# Patient Record
Sex: FEMALE | Employment: FULL TIME
[De-identification: names, ages, dates, MRNs, and addresses within clinical notes are randomized per-mention and may not be internally consistent; named-entity substitution may affect disease eponyms.]

---

## 2021-04-12 ENCOUNTER — TRANSCRIBE ORDERS (OUTPATIENT)
Dept: OTHER | Age: 46
End: 2021-04-12

## 2021-04-12 DIAGNOSIS — L65.9 ALOPECIA: Primary | ICD-10-CM

## 2021-09-14 ENCOUNTER — OFFICE VISIT (OUTPATIENT)
Dept: DERMATOLOGY | Facility: CLINIC | Age: 46
End: 2021-09-14
Attending: DERMATOLOGY
Payer: COMMERCIAL

## 2021-09-14 DIAGNOSIS — L65.9 LOSS OF HAIR: Primary | ICD-10-CM

## 2021-09-14 DIAGNOSIS — L30.9 DERMATITIS: ICD-10-CM

## 2021-09-14 PROCEDURE — 99204 OFFICE O/P NEW MOD 45 MIN: CPT | Performed by: DERMATOLOGY

## 2021-09-14 RX ORDER — GABAPENTIN 300 MG/1
300 CAPSULE ORAL
COMMUNITY
Start: 2020-08-04

## 2021-09-14 RX ORDER — SUCRALFATE 1 G/1
1 TABLET ORAL
COMMUNITY
Start: 2020-07-28

## 2021-09-14 RX ORDER — METHOCARBAMOL 500 MG/1
TABLET, FILM COATED ORAL
COMMUNITY
Start: 2021-01-09

## 2021-09-14 RX ORDER — CYCLOBENZAPRINE HCL 10 MG
10 TABLET ORAL
COMMUNITY
Start: 2020-09-22

## 2021-09-14 RX ORDER — CHLORHEXIDINE GLUCONATE ORAL RINSE 1.2 MG/ML
SOLUTION DENTAL
COMMUNITY
Start: 2021-03-05

## 2021-09-14 RX ORDER — IBUPROFEN 600 MG/1
TABLET, FILM COATED ORAL
COMMUNITY
Start: 2021-03-05

## 2021-09-14 RX ORDER — FLUOCINONIDE TOPICAL SOLUTION USP, 0.05% 0.5 MG/ML
SOLUTION TOPICAL
COMMUNITY
Start: 2020-02-13

## 2021-09-14 RX ORDER — CLOBETASOL PROPIONATE 0.5 MG/ML
SOLUTION TOPICAL
COMMUNITY
Start: 2021-06-09

## 2021-09-14 RX ORDER — SUCRALFATE 1 G/1
TABLET ORAL
COMMUNITY
Start: 2021-02-03

## 2021-09-14 RX ORDER — FLUOCINOLONE ACETONIDE 0.11 MG/ML
OIL TOPICAL
Qty: 60 ML | Refills: 5 | Status: SHIPPED | OUTPATIENT
Start: 2021-09-14

## 2021-09-14 RX ORDER — FLUOCINONIDE TOPICAL SOLUTION USP, 0.05% 0.5 MG/ML
SOLUTION TOPICAL
COMMUNITY
Start: 2020-12-21

## 2021-09-14 RX ORDER — ESTRADIOL 10 UG/1
10 INSERT VAGINAL
COMMUNITY
Start: 2020-12-01

## 2021-09-14 RX ORDER — AMLODIPINE BESYLATE 5 MG/1
TABLET ORAL
COMMUNITY
Start: 2021-05-06

## 2021-09-14 ASSESSMENT — PAIN SCALES - GENERAL: PAINLEVEL: NO PAIN (0)

## 2021-09-14 NOTE — NURSING NOTE
Chief Complaint   Patient presents with     Hair Loss     Areli, is here for a hairloss appt, no other concerns     Terry Strong EMT

## 2021-09-14 NOTE — PROGRESS NOTES
HairMetrix Summary (September 14, 2021)    Frontal anterior      Mid scalp      Vertex      Occipital      Right temporal      Left temporal      Summary

## 2021-09-14 NOTE — PROGRESS NOTES
[facility] Dermatology Note  Encounter Date: Sep 14, 2021  Office Visit     Dermatology Problem List:  1.  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   - Hair metrix performed today   - Labs ordered: CBC, CMP, Vit D, TSH, Iron studies (ferritin, iron), androgen studies (free and total testosterone, DHEAS), liver, and renal panel  - Discussed following up with [facility] allergy clinic for allergy testing  - Start applying fluocinolone (derma-smooth) oil on the scalp before bedtime. For severe flares, may use this up to twice daily.        Procedures Performed:   Hair Metrix:              - Frontal Scalp: Decreased hair density              - Mid-Scalp: Mildly decreased hair density              - Vertex Scalp: Decreased hair density with perifollicular scale              - Occipital Scalp: Decreased hair density              - Right Temporal Scalp: Severely decreased hair density with perifollicular scale              - Left Temporal Scalp: Severely decreased hair density     Follow-up: 1 week(s) virtually (telephone with photos), or earlier for new or changing lesions; 5 month(s) in person    Follow-up: 5 month(s) in-person, or earlier for new or changing lesions    Staff and Medical Student:     Sarahi Bowen, MS4      Scribe Disclosure:  I, Silvana Maki, am serving as a scribe to document services personally performed by Mona Serrato MD based on data collection and the provider's statements to me.     Provider Disclosure:   The documentation recorded by the scribe accurately reflects the services I personally performed and the decisions made by me.    Staff Physician:  I was  "present with the medical student who participated in the service and in the documentation of the note. I have verified the history and personally performed the physical exam and medical decision making. I agree with the assessment and plan of care as documented in the note.       Mona Serrato MD  Professor and Chair  Department of Dermatology  [facility]: Phone: [phone], Fax:[phone]  [facility]: Phone: [phone], Fax: [phone]      ____________________________________________    CC: No chief complaint on file.    HPI:  Ms. Areli Navarro is a 46 year old female who presents as a new patient for evaluation of hair loss.   - Seen at the request of: Dr. Drummond  - Reason for referral: Lymphocyte mediated scarring alopecia  - Onset of hair loss: 4 years ago it started to fall out and she's getting new spots (frontal scalp lost hair about a month ago)  - Affected areas: sides, lower back, and beginning to be in the front  - Shedding or thinning, or both: Initially there was more shedding, but that has decreased.  Now her hair is more so thinning.  - Percentage of hair loss: 50%  No Scalp pain   Previously Scalp burning - used to experience it when she used a certain shampoo with formaldehyde, has improved since stopping that shampoo   Yes Scalp itching - occasionally, improved with change in shampoo   No Eyebrow changes    No Eyelash changes   NA Beard changes    No Other body hair changes    No Nail changes    No Additional symptoms? (please list below)     - Current treatments: Intralesional steroid, most recent was in June of 2021; notes small improvement; Lidex solution every day for the past two years with no improvement, she reports a bottle lasts for about 3 weeks at a time  - Prior discontinued treatments: clobetasol external solution  - Prior biopsies: September 2020, \"which " "showed lymphocyte mediated scarring alopecia with lichenoid infiltrate. It was consistent with central centrifugal alopecia or frontal fibrosing alopecia or lichen planopilaris\" (records available: 9/2/20 in care everywhere)  - Prior labs: CBC from 1/5/21 WNL (records available: in care everywhere)  - Scalp or hair care habits/products:   - - - Which products? How many times per week? She started using a shampoo that contained formaldehyde about a year ago to hopefully improve her hair loss.  She reports that this caused scalp burning and itching that improved when she discontinued the shampoo.  She switched to an \"all-natural\" shampoo about a month ago.  She reports washing her hair every other day and letting it air dry.  She uses a leave in conditioner and occasionally uses aloe vera.  - - - Frequency of hair sprays, gels, dyes, heat styling, perms, weaves or extensions? She endorses braiding her hair up until 2019.  She is now wearing her natural hair.   - - - Sunscreen use on face or scalp? If so, what brand?  No  - Menses: hysterectomy in 2016 due fibroids, ovaries were not removed   - Unwanted hair growth/hirsutism: none  - Diet (meat, vegetarian, mixed): Lots of fruits and vegetables, not much meat or dairy, drinks a lot of water  - Recent weight loss: none  - Personal history of thyroid dysfunction or autoimmune disease: none  - Family history of hair loss: Yes, aunt's hair is completely gone, cousin with absolutely no hair, eyelashes, aunt with frontal alopecia, mother had localized alopecia, sister with loss of hair on the sides; daughter is 29 and has no issues with hair loss  - Family history of autoimmune disease: none  - Major family, financial, school/work, or other social stressors in the few months prior to hair loss: None  - Major recent illness/hospitalizations: none  - COVID status: Not vaccinated, had COVID in July    Patient is otherwise feeling well, in usual state of health, and has no " additional skin concerns today.     ROS: As per HPI    Labs:  CBC , CMP  and BMP  from January 2020 reviewed.    Physical Exam:  Vitals: There were no vitals taken for this visit.  GEN: Well developed, well-nourished, in no acute distress, in a pleasant mood.    SKIN: Focused examination of the scalp was performed.  - Bautista type: V  - No diffuse erythema   - Positive for perifollicular erythema  - Positive for perifollicular scale   - No scaling of the scalp   - Negative hair pull test   - Normal eyelash density  - Normal eyebrow density  - No nail pitting or dystrophy   - No scalp folliculitis/pustules   - In comparison to prior photographs, no prior photos available  - No other lesions of concern on areas examined.     Medications:  No current outpatient medications on file.     No current facility-administered medications for this visit.      Past Medical History:   There is no problem list on file for this patient.    No past medical history on file.    CC Jean-Pierre Drummond MD  [facility] [address] [place] [place] [address] on close of this encounter.

## 2021-09-14 NOTE — PATIENT INSTRUCTIONS
Please arrange a consultation with the contact dermatitis clinic at [facility].    The site to look at is called [facility].     Let us know where you will have labs done and the clinic will fax the requests to this lab.     Apply oil 2 to 3 times per week to scalp, ideally shampoo after application.

## 2021-11-07 ENCOUNTER — HEALTH MAINTENANCE LETTER (OUTPATIENT)
Age: 46
End: 2021-11-07

## 2021-11-26 ENCOUNTER — VIRTUAL VISIT (OUTPATIENT)
Dept: DERMATOLOGY | Facility: CLINIC | Age: 46
End: 2021-11-26
Payer: COMMERCIAL

## 2021-11-26 DIAGNOSIS — L30.9 DERMATITIS: ICD-10-CM

## 2021-11-26 DIAGNOSIS — Z86.018 HISTORY OF UTERINE FIBROID: ICD-10-CM

## 2021-11-26 DIAGNOSIS — L66.9 CICATRICIAL ALOPECIA: Primary | ICD-10-CM

## 2021-11-26 PROCEDURE — 99213 OFFICE O/P EST LOW 20 MIN: CPT | Mod: TEL | Performed by: DERMATOLOGY

## 2021-11-26 ASSESSMENT — PAIN SCALES - GENERAL: PAINLEVEL: NO PAIN (0)

## 2021-11-26 NOTE — PROGRESS NOTES
[facility] Dermatology Note  Encounter Date: Nov 26, 2021  Store-and-Forward and Telephone ([phone]). Location of teledermatologist: [facility] [place].  Start time: 7:32. End time: 7:45    Dermatology Problem List:  1.  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  - Current tx: fluocinolone oil  -Hair metrix performed at last inperson visit  - Labs ordered: CBC, CMP, Vit D, TSH, Iron studies (ferritin, iron), androgen studies (free and total testosterone, DHEAS), liver, and renal panel - overall, normal   - Discussed following up with [facility] allergy clinic for allergy testing - Ms. Navarro prefers to wait for now  - Fluocinolone (derma-smooth) oil on the scalp before bedtime. For severe flares, may use this up to twice daily.  No issues with this product.  -Introduce topical metformin based on recent reports from [facility], may be particularly beneficial to patients with CCCA/history of fibroids; in this case the use of this medication would be for LPP and history of uterine fibroids. Of note, it is difficult to histologically differentiate between LPP, CCCA and FFA.     Procedures Performed:    None    Follow-up: move visit to March, 2021    Mona Serrato MD  Professor and Chair  Department of Dermatology  [facility]      ____________________________________________    CC: Derm Problem (Areli is here today to discuss biopsy results  )    HPI:  Ms. 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  She saw Dr. Drummond since her last visit here. He kindly ordered the labs we requested and continued topical care.  Of note, she also has a history of uterine fibroids and with the recent work from [facility] on the use of topical metformin for treatment of CCCA, another cicatricial alopecia with a primarily lymphocytic inflammatory infiltrate we discussed that the addition of this medication may be beneficial.     Hair regrowth in 2 patients with recalcitrant central centrifugal cicatricial alopecia after use of topical metformin  GREGG Hewitt,Yessy Rubalcava, MPH, Nimisha Singh MD   Open Access JAAD Published:January 22, 2020  DOI:[email or web address]    Patient is otherwise feeling well, without additional skin concerns.    Labs Reviewed:  CBC, CMP, Vit D, TSH, Iron studies (ferritin, iron), androgen studies (free and total testosterone, DHEAS), liver, and renal panel - overall, normal     Physical Exam: NA as this was a telephone follow-up visit   Vitals: There were no vitals taken for this visit.      Medications:  Current Outpatient Medications   Medication     amLODIPine (NORVASC) 5 MG tablet     chlorhexidine (PERIDEX) 0.12 % solution     clobetasol (TEMOVATE) 0.05 % external solution     cyclobenzaprine (FLEXERIL) 10 MG tablet     estradiol (VAGIFEM) 10 MCG TABS vaginal tablet     Fluocinolone Acetonide Scalp (DERMA-SMOOTHE/FS SCALP) 0.01 % OIL oil     fluocinonide (LIDEX) 0.05 % external solution     fluocinonide (LIDEX) 0.05 % external solution     gabapentin (NEURONTIN) 300 MG capsule     ibuprofen (ADVIL/MOTRIN) 600 MG tablet     methocarbamol (ROBAXIN) 500 MG tablet     sucralfate (CARAFATE) 1 GM tablet     sucralfate (CARAFATE) 1 GM tablet     No current facility-administered medications  for this visit.      Past Medical/Surgical History:   There is no problem list on file for this patient.    No past medical history on file.    CC Anita Heaton NP  [facility]  [address] [place] [place] [address] on close of this encounter.    Dr. Darin Epstein [facility]     Teledermatology Nurse Call Patients:     Are you in the [facility] at the time of the encounter? yes    Today's visit will be billed to you and your insurance.    A teledermatology visit is not as thorough as an in-person visit and the quality of the photograph sent may not be of the same quality as that taken by the dermatology clinic.

## 2021-11-26 NOTE — NURSING NOTE
Areli Navarro's goals for this visit include:   Chief Complaint   Patient presents with     Derm Problem     Areli is here today to discuss biopsy results        She requests these members of her care team be copied on today's visit information:     PCP: Anita Heaton    Referring Provider:  Anita Heaton NP  [facility]  [address] [place]  [place] [address]    There were no vitals taken for this visit.    Do you need any medication refills at today's visit? Lara Bautista LPN                                
maximum assist (25% patients effort)

## 2021-11-26 NOTE — LETTER
11/26/2021       RE: Areli Navarro  [address]  [place] [address]     Dear Colleague,    Thank you for referring your patient, Areli Navarro, to the [facility]ERMAT[facility] [place] at [facility]. Please see a copy of my visit note below.    [facility] Dermatology Note  Encounter Date: Nov 26, 2021  Store-and-Forward and Telephone ([phone]). Location of teledermatologist: [facility] [place].  Start time: 7:32. End time: 7:45    Dermatology Problem List:  1.  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  - Current tx: fluocinolone oil  -Hair metrix performed at last inperson visit  - Labs ordered: CBC, CMP, Vit D, TSH, Iron studies (ferritin, iron), androgen studies (free and total testosterone, DHEAS), liver, and renal panel - overall, normal   - Discussed following up with [facility] allergy clinic for allergy testing - Ms. Navarro prefers to wait for now  - Fluocinolone (derma-smooth) oil on the scalp before bedtime. For severe flares, may use this up to twice daily.  No issues with this product.  -Introduce topical metformin based on recent reports from [facility], may be particularly beneficial to patients with CCCA/history of fibroids; in this case the use of this medication would be for LPP and history of uterine fibroids. Of note, it is difficult to histologically differentiate  between LPP, CCCA and FFA.     Procedures Performed:    None    Follow-up: move visit to March, 2021    Mona Serrato MD  Professor and Chair  Department of Dermatology  [facility]      ____________________________________________    CC: Derm Problem (Areli is here today to discuss biopsy results )    HPI:  Ms. 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  She saw Dr. Drummond since her last visit here. He kindly ordered the labs we requested and continued topical care.  Of note, she also has a history of uterine fibroids and with the recent work from [facility] on the use of topical metformin for treatment of CCCA, another cicatricial alopecia with a primarily lymphocytic inflammatory infiltrate we discussed that the addition of this medication may be beneficial.     Hair regrowth in 2 patients with recalcitrant central centrifugal cicatricial alopecia after use of topical metformin  GREGG Hewitt,Yessy Rubalcava, MPH, Nimisha Singh MD   Open Access JAAD Published:January 22, 2020  DOI:[email or web address]    Patient is otherwise feeling well, without additional skin concerns.    Labs Reviewed:  CBC, CMP, Vit D, TSH, Iron studies (ferritin, iron), androgen studies (free and total testosterone, DHEAS), liver, and renal panel - overall, normal     Physical Exam: NA as this was a telephone follow-up visit   Vitals: There were no vitals taken for this visit.      Medications:  Current Outpatient Medications   Medication     amLODIPine (NORVASC) 5 MG tablet     chlorhexidine (PERIDEX) 0.12 % solution     clobetasol (TEMOVATE) 0.05 % external solution     cyclobenzaprine (FLEXERIL) 10 MG tablet     estradiol (VAGIFEM) 10 MCG TABS vaginal tablet     Fluocinolone Acetonide Scalp (DERMA-SMOOTHE/FS SCALP) 0.01 % OIL oil     fluocinonide (LIDEX) 0.05 %  external solution     fluocinonide (LIDEX) 0.05 % external solution     gabapentin (NEURONTIN) 300 MG capsule     ibuprofen (ADVIL/MOTRIN) 600 MG tablet     methocarbamol (ROBAXIN) 500 MG tablet     sucralfate (CARAFATE) 1 GM tablet     sucralfate (CARAFATE) 1 GM tablet     No current facility-administered medications for this visit.      Past Medical/Surgical History:   There is no problem list on file for this patient.    No past medical history on file.    CC Anita Heaton NP  [facility]  [address] [place] [address] on close of this encounter.    Dr. Darin Epstein [facility]     Teledermatology Nurse Call Patients:     Are you in the [facility] at the time of the encounter? yes    Today's visit will be billed to you and your insurance.    A teledermatology visit is not as thorough as an in-person visit and the quality of the photograph sent may not be of the same quality as that taken by the dermatology clinic.        Again, thank you for allowing me to participate in the care of your patient.      Sincerely,    Mona Serrato MD

## 2021-11-28 NOTE — PATIENT INSTRUCTIONS
Topical metformin is being  Requested from the compounding pharmacy for daily to twice daily use to the scalp. Recommend the topical steroid oil be applied at night and if possible, the new medicine in the morning .    I have requested that your appointment be moved to March, 2021.

## 2021-11-30 ENCOUNTER — TELEPHONE (OUTPATIENT)
Dept: DERMATOLOGY | Facility: CLINIC | Age: 46
End: 2021-11-30
Payer: COMMERCIAL

## 2021-11-30 NOTE — TELEPHONE ENCOUNTER
RE: please check with the compounding pharmacy the best way to order the following medication - see below  Received: Today  Mona Serrato MD Gabriel, Cassandra M, RN  We need to work with our [place] compounding pharmacy -   [facility] is where the lipoderm was obtained. Whether our pharmacy plans to use the same place or not will be up to them.   Regards,   MH             Previous Messages       ----- Message -----   From: Vicki Martinez RN   Sent: 11/29/2021  12:50 PM CST   To: Mona Serrato MD   Subject: RE: please check with the compounding pharma*     Is [facility] in [place] the pharmacy?  Thanks!   Vicki Martinez RN     ----- Message -----   From: Mona Serrato MD   Sent: 11/28/2021  11:44 AM CST   To: Vicki Martinez RN   Subject: please check with the compounding pharmacy t*     metformin 10% compounded in Lipoderm ([facility], [place], [place])     We need to order this for Ms. Navarro this week  - thanks

## 2021-12-21 ENCOUNTER — TELEPHONE (OUTPATIENT)
Dept: DERMATOLOGY | Facility: CLINIC | Age: 46
End: 2021-12-21
Payer: COMMERCIAL

## 2021-12-21 NOTE — TELEPHONE ENCOUNTER
Hi, we shoudl touch bases one on one to do this together -  Received: 2 weeks ago  Mona Serrato MD Gabriel, Cassandra M, RN  Hi,   Let's touch bases and figure this one out together. Monday or Tuesday would be great.                Previous Messages       ----- Message -----   From: Vicki aMrtinez RN   Sent: 11/30/2021   8:51 AM CST   To: Mona Serrato MD   Subject: RE: please check with the compounding pharma*     I called the  compounding pharmacy.  They are able to mix the compound, but need orders for both medications, they can not use what the patient already has.  Thank you!   Vicki Martinez RN     ----- Message -----   From: Mona Serrato MD   Sent: 11/30/2021   6:18 AM CST   To: Vicki Martinez RN   Subject: RE: please check with the compounding pharma*     We need to work with our [place] compounding pharmacy -   [facility] is where the lipoderm was obtained. Whether our pharmacy plans to use the same place or not will be up to them.   Regards,      ----- Message -----   From: Vicki Martinez RN   Sent: 11/29/2021  12:50 PM CST   To: Mona Serrato MD   Subject: RE: please check with the compounding pharma*     Is [facility] in [place] the pharmacy?  Thanks!   Vicki Martinez RN     ----- Message -----   From: oMna Serrato MD   Sent: 11/28/2021  11:44 AM CST   To: Vicki Martinez RN   Subject: please check with the compounding pharmacy t*     metformin 10% compounded in Lipoderm ([facility], [place], [place])     We need to order this for Ms. Navarro this week  - thanks

## 2022-04-13 ENCOUNTER — TELEPHONE (OUTPATIENT)
Dept: DERMATOLOGY | Facility: CLINIC | Age: 47
End: 2022-04-13
Payer: COMMERCIAL

## 2022-11-19 ENCOUNTER — HEALTH MAINTENANCE LETTER (OUTPATIENT)
Age: 47
End: 2022-11-19

## 2023-04-09 ENCOUNTER — HEALTH MAINTENANCE LETTER (OUTPATIENT)
Age: 48
End: 2023-04-09

## 2024-01-28 ENCOUNTER — HEALTH MAINTENANCE LETTER (OUTPATIENT)
Age: 49
End: 2024-01-28

## 2024-04-07 ENCOUNTER — HEALTH MAINTENANCE LETTER (OUTPATIENT)
Age: 49
End: 2024-04-07